# Patient Record
Sex: FEMALE | NOT HISPANIC OR LATINO | Employment: OTHER | URBAN - METROPOLITAN AREA
[De-identification: names, ages, dates, MRNs, and addresses within clinical notes are randomized per-mention and may not be internally consistent; named-entity substitution may affect disease eponyms.]

---

## 2017-03-29 ENCOUNTER — APPOINTMENT (OUTPATIENT)
Dept: MRI IMAGING | Facility: HOSPITAL | Age: 72
End: 2017-03-29
Attending: NEUROLOGICAL SURGERY

## 2017-04-18 ENCOUNTER — HOSPITAL ENCOUNTER (OUTPATIENT)
Dept: MRI IMAGING | Facility: HOSPITAL | Age: 72
Discharge: HOME | End: 2017-04-18
Attending: NEUROLOGICAL SURGERY | Admitting: NEUROLOGICAL SURGERY

## 2017-04-18 ENCOUNTER — OFFICE VISIT (OUTPATIENT)
Dept: NEUROSURGERY | Facility: CLINIC | Age: 72
End: 2017-04-18

## 2017-04-18 VITALS
HEART RATE: 76 BPM | BODY MASS INDEX: 34.96 KG/M2 | WEIGHT: 190 LBS | SYSTOLIC BLOOD PRESSURE: 126 MMHG | DIASTOLIC BLOOD PRESSURE: 74 MMHG | HEIGHT: 62 IN

## 2017-04-18 DIAGNOSIS — D33.3 RIGHT ACOUSTIC NEUROMA (HCC): Primary | ICD-10-CM

## 2017-04-18 DIAGNOSIS — D33.3 RIGHT ACOUSTIC NEUROMA (HCC): ICD-10-CM

## 2017-04-18 DIAGNOSIS — H93.3X1: ICD-10-CM

## 2017-04-18 PROCEDURE — 70552 MRI BRAIN STEM W/DYE: CPT

## 2017-04-18 PROCEDURE — 82565 ASSAY OF CREATININE: CPT

## 2017-04-18 PROCEDURE — 99213 OFFICE O/P EST LOW 20 MIN: CPT | Performed by: NEUROLOGICAL SURGERY

## 2017-04-18 PROCEDURE — 0 GADOBENATE DIMEGLUMINE 529 MG/ML SOLUTION: Performed by: NEUROLOGICAL SURGERY

## 2017-04-18 PROCEDURE — A9577 INJ MULTIHANCE: HCPCS | Performed by: NEUROLOGICAL SURGERY

## 2017-04-18 RX ORDER — POTASSIUM CHLORIDE 20 MEQ/1
TABLET, EXTENDED RELEASE ORAL
Refills: 0 | COMMUNITY
Start: 2017-04-04

## 2017-04-18 RX ADMIN — GADOBENATE DIMEGLUMINE 17 ML: 529 INJECTION, SOLUTION INTRAVENOUS at 12:49

## 2017-04-18 NOTE — PROGRESS NOTES
"Subjective   Patient ID: Jana Muniz is a 71 y.o. female who is here today for follow-up for acoustic neuroma. She had an MRI of the IAC today at [facility]. She presents accompanied by her sister.    History of Present Illness     She returns to the office today for follow-up after repeat MRI of the IAC today at [facility].  She is being seen for continued surveillance of an acoustic neuroma.  She is status post right occipital craniotomy and resection on April 21, 2016.    She is doing better with regard to hearing and she now has a hearing aid in the right ear that is helping transmits down to the left.  She still has ongoing right sided facial droop and weakness.  This was improved with the use of a stimulation, physical therapy and speech therapy.  She reports that since she was last here she's undergone complete L hip replacement in February and is doing well.     Otherwise, she reports doing very well.  She is walking better following the hip replacement and is helping to take care of her 92-year-old and.    She presents with her sister.    /74 (BP Location: Left arm, Patient Position: Sitting, Cuff Size: Adult)  Pulse 76  Ht 62\" (157.5 cm)  Wt 190 lb (86.2 kg)  BMI 34.75 kg/m2    The following portions of the patient's history were reviewed and updated as appropriate: allergies, current medications, past family history, past medical history, past social history, past surgical history and problem list.    Review of Systems   HENT: Negative for tinnitus.    Eyes: Negative for visual disturbance.   Genitourinary: Negative for enuresis.   Neurological: Positive for numbness (Right side of the face). Negative for dizziness, weakness and headaches.       Objective   Physical Exam   Constitutional: She is oriented to person, place, and time. Vital signs are normal. She appears well-developed and well-nourished. She is cooperative.  Non-toxic appearance. She does not have a sickly appearance. She does " not appear ill.   HENT:   Head: Atraumatic.   Right sided hearing aid   Eyes: EOM are normal. Pupils are equal, round, and reactive to light.   Neck: Normal range of motion. Neck supple. No tracheal deviation present.   Pulmonary/Chest: Effort normal and breath sounds normal. No respiratory distress. She has no wheezes.   Abdominal: Soft.   Musculoskeletal: She exhibits no edema, tenderness or deformity.   Neurological: She is alert and oriented to person, place, and time. She displays no tremor and normal reflexes. A cranial nerve deficit is present. No sensory deficit. She exhibits normal muscle tone. Coordination and gait normal. GCS eye subscore is 4. GCS verbal subscore is 5. GCS motor subscore is 6.   Right peripheral facial weakness   Skin: Skin is warm and dry.   Psychiatric: She has a normal mood and affect. Her behavior is normal. Thought content normal.   Vitals reviewed.    Neurologic Exam     Mental Status   Oriented to person, place, and time.   Oriented to person.   Oriented to place.   Level of consciousness: alert    Cranial Nerves     CN II   Visual fields full to confrontation.     CN III, IV, VI   Pupils are equal, round, and reactive to light.  Extraocular motions are normal.     CN V   Right facial sensation deficit: complete  Left facial sensation deficit: none    CN VII   Right facial weakness: peripheral    CN VIII   Hearing: impaired  Turcios: lateralizes right     CN IX, X   CN IX normal.     CN XI   CN XI normal.     CN XII   CN XII normal.   Tongue: not atrophic  Tongue deviation: none      Assessment/Plan   Independent Review of Radiographic Studies:    I personally reviewed the MRI of brain done today that demonstrates 100% resection of the previously identified right acoustic tumor.  Medical Decision Making:    I confirmed and obtained the above history as recorded by the nurse practitioner acting as a scribe. I performed the above examination and it is documented by the nurse  practitioner acting as a scribe.    The patient is doing very well as described above.    Plan: Follow-up MRI in 1 year.  If that is stable anticipate follow-up 2 years after.  Jana was seen today for acoustic neuroma.    Diagnoses and all orders for this visit:    Right acoustic neuroma  -     Cancel: MRI Internal Auditory Canal With Wo; Future  -     MRI Internal Auditory Canal With Contrast; Future    Acoustic neuroma syndrome, right    Return in about 1 year (around 4/18/2018) for Follow up with nurse practitioner.

## 2017-04-20 LAB — CREAT BLDA-MCNC: 1 MG/DL (ref 0.6–1.3)

## 2018-01-23 ENCOUNTER — TELEPHONE (OUTPATIENT)
Dept: NEUROSURGERY | Facility: CLINIC | Age: 73
End: 2018-01-23

## 2018-01-23 DIAGNOSIS — D33.3 RIGHT ACOUSTIC NEUROMA (HCC): Primary | ICD-10-CM

## 2018-01-23 NOTE — TELEPHONE ENCOUNTER
Patient last seen on 4/18/17 by Dr Castillo for right acoustic neuroma. Dr Castillo said to follow up with [facility] in 1 yr     .Patient called and scheduled her recall appt for May 2,2018 with Melody. Pt is suppose to have an MRI the same day.She is coming from 3 hrs away. The order for her MRI  IAC expires  in April 2018. Dejah Cylande is holding an 11:15 time slot for this patient on May 2.

## 2018-05-02 ENCOUNTER — HOSPITAL ENCOUNTER (OUTPATIENT)
Dept: MRI IMAGING | Facility: HOSPITAL | Age: 73
Discharge: HOME | End: 2018-05-02
Admitting: NURSE PRACTITIONER

## 2018-05-02 ENCOUNTER — OFFICE VISIT (OUTPATIENT)
Dept: NEUROSURGERY | Facility: CLINIC | Age: 73
End: 2018-05-02

## 2018-05-02 VITALS
HEIGHT: 62 IN | BODY MASS INDEX: 35.7 KG/M2 | HEART RATE: 64 BPM | WEIGHT: 194 LBS | DIASTOLIC BLOOD PRESSURE: 70 MMHG | SYSTOLIC BLOOD PRESSURE: 120 MMHG

## 2018-05-02 DIAGNOSIS — D33.3 RIGHT ACOUSTIC NEUROMA (HCC): ICD-10-CM

## 2018-05-02 DIAGNOSIS — D33.3 RIGHT ACOUSTIC NEUROMA (HCC): Primary | ICD-10-CM

## 2018-05-02 DIAGNOSIS — Z98.890 S/P CRANIOTOMY: ICD-10-CM

## 2018-05-02 DIAGNOSIS — H90.71 MIXED CONDUCTIVE AND SENSORINEURAL HEARING LOSS OF RIGHT EAR WITH UNRESTRICTED HEARING OF LEFT EAR: ICD-10-CM

## 2018-05-02 PROCEDURE — 99213 OFFICE O/P EST LOW 20 MIN: CPT | Performed by: NURSE PRACTITIONER

## 2018-05-02 PROCEDURE — 0 GADOBENATE DIMEGLUMINE 529 MG/ML SOLUTION: Performed by: NURSE PRACTITIONER

## 2018-05-02 PROCEDURE — 82565 ASSAY OF CREATININE: CPT

## 2018-05-02 PROCEDURE — A9577 INJ MULTIHANCE: HCPCS | Performed by: NURSE PRACTITIONER

## 2018-05-02 PROCEDURE — 70553 MRI BRAIN STEM W/O & W/DYE: CPT

## 2018-05-02 RX ORDER — ESOMEPRAZOLE MAGNESIUM 40 MG/1
40 CAPSULE, DELAYED RELEASE ORAL
COMMUNITY

## 2018-05-02 RX ADMIN — GADOBENATE DIMEGLUMINE 18 ML: 529 INJECTION, SOLUTION INTRAVENOUS at 12:16

## 2018-05-02 NOTE — PROGRESS NOTES
"Subjective   Patient ID: Jana Muniz is a 72 y.o. female who is here today for follow-up for an acoustic neuroma. She had an MRI of the IAC at [facility] today. She presents accompanied by her sisters.     History of Present Illness     She returns to the office today for one-year follow-up with history of a right sided acoustic neuroma status post craniotomy and resection with Dr. Castillo on April 21, 2016.  She is undergone repeat MRI brain imaging for continued surveillance.      Following surgery she has complete hearing loss in the right ear, but she did get a hearing aid in the right ear that is helping transmit sound to the left.  She continues to have some right-sided facial weakness that did improve with the use of an E stimulator.  She is considering getting surgery on the right upper eyelid secondary to lid lag for weakness that is beginning to obstruct her vision.  She is pending a nerve conduction study and EMG on the right side of her face.  She continues to report some issues with balance but denies any falls.  This typically presents when she is walking too fast or when she turns quickly.  Overall, she states that she is feeling very well.  Since her last office visit she has undergone complete left hip replacement.    She presents with 2 sisters.      /70 (BP Location: Right arm, Patient Position: Sitting, Cuff Size: Large Adult)   Pulse 64   Ht 157.5 cm (62\")   Wt 88 kg (194 lb)   BMI 35.48 kg/m²       The following portions of the patient's history were reviewed and updated as appropriate: allergies, current medications, past family history, past medical history, past social history, past surgical history and problem list.    Review of Systems   HENT: Positive for tinnitus.    Eyes: Negative for visual disturbance.   Musculoskeletal: Positive for gait problem.   Neurological: Positive for dizziness. Negative for speech difficulty, weakness, numbness and headaches.       Objective   Physical " Exam   Constitutional: She is oriented to person, place, and time. Vital signs are normal. She appears well-developed and well-nourished. She is cooperative.   Very pleasant, older, obese female   HENT:   Head: Normocephalic and atraumatic.   Eyes: EOM are normal. Pupils are equal, round, and reactive to light.   Cardiovascular: Intact distal pulses.    Pulmonary/Chest: Effort normal.   Abdominal: Soft.   Musculoskeletal: She exhibits no tenderness or deformity.   Moves all extremities well   Neurological: She is alert and oriented to person, place, and time. She has normal strength. A cranial nerve deficit is present. No sensory deficit. She has a normal Finger-Nose-Finger Test and a normal Romberg Test. Gait normal. Coordination and gait normal. GCS eye subscore is 4. GCS verbal subscore is 5. GCS motor subscore is 6.   Stable upright gait, normal station  Able to heel and toe walk  Finger to nose intact bilaterally     Skin: Skin is warm and dry.   Psychiatric: She has a normal mood and affect. Her behavior is normal. Judgment and thought content normal.   Vitals reviewed.    Neurologic Exam     Mental Status   Oriented to person, place, and time.     Cranial Nerves     CN II   Right visual field deficit: none  Left visual field deficit: none     CN III, IV, VI   Pupils are equal, round, and reactive to light.  Extraocular motions are normal.   Right pupil: Shape: regular.   Left pupil: Shape: regular.   CN III: no CN III palsy  CN VI: no CN VI palsy  Diplopia: none    CN V   Right facial sensation deficit: complete  Left facial sensation deficit: none    CN VII   Right facial weakness: peripheral  Left facial weakness: none    CN VIII   Hearing impaired: Total hearing loss on the right.    CN XII   CN XII normal.   Tongue: not atrophic  Fasciculations: absent    Motor Exam     Strength   Strength 5/5 throughout.     Gait, Coordination, and Reflexes     Gait  Gait: normal    Coordination   Romberg:  negative  Finger to nose coordination: normal    Tremor   Resting tremor: absent      Assessment/Plan   Independent Review of Radiographic Studies:    MRI internal auditory canals with and without contrast from [facility] dated May 2, 2018 reveals no residual or recurrent right-sided acoustic neuroma with no other acute abnormalities.      Medical Decision Making:    She returns the office today for one-year follow-up with repeat MRI internal auditory canal imaging status post acoustic neuroma resection 2 years ago with Dr. Castillo.  Exam as noted above, no red flags.  MRI imaging reveals stable findings with no sign of residual or recurrent tumor.  She is doing well with the use of the hearing aid in the right ear that helps amplify hearing on the left.  She states this makes a big difference with regard to her overall hearing status.  She is pending nerve conduction study test on the right side of her face for consideration of surgery to correct the right upper lid lag that is beginning to obstruct her vision.  From our standpoint she is stable and doing well.  She does report some intermittent balance issues but denies falls.  Her gait is stable and upright on examination today.  With stable imaging findings we'll plan on seeing her back in 2 years with repeat MRI for continued surveillance.    Plan: Return to office in 2 years with repeat MRI JCAQUELYN Bates was seen today for brain tumor.    Diagnoses and all orders for this visit:    Right acoustic neuroma    Mixed conductive and sensorineural hearing loss of right ear with unrestricted hearing of left ear    S/P craniotomy      Return in about 2 years (around 5/2/2020).

## 2018-05-03 LAB — CREAT BLDA-MCNC: 1.1 MG/DL (ref 0.6–1.3)

## 2020-03-04 ENCOUNTER — TELEPHONE (OUTPATIENT)
Dept: NEUROSURGERY | Facility: CLINIC | Age: 75
End: 2020-03-04

## 2020-03-04 DIAGNOSIS — D33.3 RIGHT ACOUSTIC NEUROMA (HCC): Primary | ICD-10-CM

## 2020-05-13 ENCOUNTER — APPOINTMENT (OUTPATIENT)
Dept: MRI IMAGING | Facility: HOSPITAL | Age: 75
End: 2020-05-13

## 2020-06-01 ENCOUNTER — TELEPHONE (OUTPATIENT)
Dept: NEUROSURGERY | Facility: CLINIC | Age: 75
End: 2020-06-01

## 2020-06-01 NOTE — TELEPHONE ENCOUNTER
PATIENT IS SCHEDULED FOR A MRI ON 6/16 AND HER APPT WAS SCHEDULED FOR 6/16 BUT HAS BEEN CANCELLED. PATIENT WANTS TO KNOW IF HER MRI CAN BE DONE AT [facility] IN [place], [place] AND ONCE REVIEWED BY CASANDRA BARTON IF SHE WOULD THEN NEED TO COME IN AND BE SEEN.    PATIENT CAN BE REACHED [phone]

## 2020-06-02 ENCOUNTER — TELEPHONE (OUTPATIENT)
Dept: NEUROSURGERY | Facility: CLINIC | Age: 75
End: 2020-06-02

## 2020-06-02 NOTE — TELEPHONE ENCOUNTER
PATIENT IS SCHEDULED FOR A MRI ON 6/16 AND HER APPT WAS SCHEDULED FOR 6/16 BUT HAS BEEN CANCELLED. PATIENT WANTS TO KNOW IF HER MRI CAN BE DONE AT [facility] IN [place], [place] AND ONCE REVIEWED BY CASANDRA BARTON IF SHE WOULD THEN NEED TO COME IN AND BE SEEN.     PATIENT CAN BE REACHED [phone]

## 2020-06-08 NOTE — TELEPHONE ENCOUNTER
LMR that order is being mailed to her today and to call us to schedule telephone visit once she has it set up.  All instructions left on VM for pt.

## 2020-06-16 ENCOUNTER — APPOINTMENT (OUTPATIENT)
Dept: MRI IMAGING | Facility: HOSPITAL | Age: 75
End: 2020-06-16

## 2020-07-01 NOTE — PROGRESS NOTES
Subjective   History of Present Illness: Jana Muniz is a 74 y.o. female is here today for follow-up via telephone visit. She was last in the office 5/2/18 for follow-up of an acoustic neuroma. She had MRI IAC at [facility] 6/24/20. Today she reports her only issue is her balance/gait. She states this has been an issue since her surgery with Dr. Castillo in 2016.     You have chosen to receive care through a telephone visit. Do you consent to use a telephone visit for your medical care today? Yes      History of Present Illness  She still has imbalance, but says it is somewhat better since she was last seen. She has been to therapy for her balance which has helped. She says she does not fall, but she is still unsure of her footing, worse when stepping over objects or stepping up. She did fall in November 2019 but it was a mechanical fall and she broke her leg. Due to the broken leg, she was unable to get the surgery for her lid lag which was schedule in December and then the coronavirus hit, so she is currently trying to get that surgery rescheduled.     The following portions of the patient's history were reviewed and updated as appropriate: allergies, current medications, past family history, past medical history, past social history, past surgical history and problem list.    Review of Systems   Constitutional: Negative for chills and fever.   Eyes: Negative for visual disturbance.   Genitourinary: Negative for difficulty urinating and frequency.   Musculoskeletal: Positive for gait problem.   Neurological: Negative for dizziness, syncope, speech difficulty, light-headedness and headaches.   Psychiatric/Behavioral: Negative for confusion and decreased concentration.       Objective     .There were no vitals taken for this visit.   There is no height or weight on file to calculate BMI.      Physical Exam  Neurologic Exam  Physical exam deferred due to this being a telephone visit    Assessment/Plan    Independent Review of Radiographic Studies:      I personally reviewed the images from the following studies.    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 She also lives over 3 hours away. I spent 12 minutes on the phone discussing the results of the MRI and follow-up plan.  She currently has no new symptoms imbalance which actually has gotten better.  She is going to schedule the lid surgery as soon as she can.  We will follow-up again in 2 years with another MRI but she knows to call sooner with any questions or concerns.  That follow-up also can be by phone if it works out better for her as she really appreciated it today since she lives 3 hours away.    Jana was seen today for right acoustic neuroma.    Diagnoses and all orders for this visit:    Imbalance    History of acoustic neuroma  -     MRI internal auditory canal w wo; Future      Return in about 2 years (around 7/7/2022).

## 2020-07-07 ENCOUNTER — OFFICE VISIT (OUTPATIENT)
Dept: NEUROSURGERY | Facility: CLINIC | Age: 75
End: 2020-07-07

## 2020-07-07 ENCOUNTER — TELEPHONE (OUTPATIENT)
Dept: NEUROSURGERY | Facility: CLINIC | Age: 75
End: 2020-07-07

## 2020-07-07 DIAGNOSIS — R26.89 IMBALANCE: Primary | ICD-10-CM

## 2020-07-07 DIAGNOSIS — Z86.018 HISTORY OF ACOUSTIC NEUROMA: ICD-10-CM

## 2020-07-07 PROCEDURE — 99442 PR PHYS/QHP TELEPHONE EVALUATION 11-20 MIN: CPT | Performed by: NURSE PRACTITIONER

## 2020-07-07 RX ORDER — CEPHALEXIN 500 MG/1
500 CAPSULE ORAL 4 TIMES DAILY
COMMUNITY
Start: 2020-05-06

## 2020-07-07 NOTE — TELEPHONE ENCOUNTER
Attempted to reach patient regarding clinical workup prior to telephone visit with Janie Martinez today at 9:45am. Left message for patient to return my call.

## 2022-07-18 DIAGNOSIS — D33.3 RIGHT ACOUSTIC NEUROMA: Primary | ICD-10-CM

## 2022-07-29 ENCOUNTER — TELEPHONE (OUTPATIENT)
Dept: NEUROSURGERY | Facility: CLINIC | Age: 77
End: 2022-07-29

## 2022-07-29 NOTE — TELEPHONE ENCOUNTER
I spoke with Ms. Muniz was returning a call from Mounika. She reports she would like to avoid coming to [place] for a follow up visit and MRI due to her being 4 hours away and the time changes and all. She reports she would like to have them MRI at [facility] and would like for them to send the doctor the results and us call her with the results and let her know if she needs to be seen in the office.

## 2022-07-29 NOTE — TELEPHONE ENCOUNTER
Spoke with pt and she is going to have her PCP refer her to someone closer to home for her acoustic neuroma.

## 2022-10-17 NOTE — PROGRESS NOTES
Subjective   Patient ID: Jana Muniz is a 77 y.o. female is here today for follow-up with a new MRI IAC done on 10/18/2022 at [facility].    Today patient stats that she Is having gait/balance issues. Patient is having issues with stepping over things.     Patient, Provider, and MA are all wearing a mask in our office today.     History of Present Illness     This patient returns today.  She feels that her gait and balance issues are getting a little bit worse.    The following portions of the patient's history were reviewed and updated as appropriate: allergies, current medications, past family history, past medical history, past social history, past surgical history and problem list.    Review of Systems   Constitutional: Negative for chills and fever.   HENT: Negative for congestion.    Eyes: Negative for photophobia and visual disturbance.   Musculoskeletal: Positive for gait problem. Negative for myalgias and neck stiffness.   Neurological: Negative for numbness and headaches.       I have reviewed the review of systems as documented by my MA.      Objective     There were no vitals filed for this visit.  There is no height or weight on file to calculate BMI.      Physical Exam  Neurological:      Mental Status: She is alert and oriented to person, place, and time.       Neurologic Exam     Mental Status   Oriented to person, place, and time.           Assessment & Plan   Independent Review of Radiographic Studies:      I personally reviewed the images from the following studies.    I reviewed her MRI which was done earlier today.  It is not yet been read.  I do not see any evidence of recurrence nor do I see any evidence of a stroke.    Medical Decision Making:      I told the patient and her daughter about the imaging.  I think the trouble she is having with walking is simply due to aging and decreased ability to compensate for a previous deficit.  I told her I will call her with the radiology reports she will do  something different than what I thought I saw.  Otherwise we will scan her again in 2 years.    Diagnoses and all orders for this visit:    1. Right acoustic neuroma (HCC) (Primary)  -     MRI internal auditory canal w wo; Future      Return in about 2 years (around 10/18/2024).

## 2022-10-18 ENCOUNTER — OFFICE VISIT (OUTPATIENT)
Dept: NEUROSURGERY | Facility: CLINIC | Age: 77
End: 2022-10-18

## 2022-10-18 ENCOUNTER — APPOINTMENT (OUTPATIENT)
Dept: OTHER | Facility: HOSPITAL | Age: 77
End: 2022-10-18

## 2022-10-18 ENCOUNTER — HOSPITAL ENCOUNTER (OUTPATIENT)
Dept: MRI IMAGING | Facility: HOSPITAL | Age: 77
Discharge: HOME | End: 2022-10-18

## 2022-10-18 DIAGNOSIS — Z09 FOLLOW-UP EXAM: ICD-10-CM

## 2022-10-18 DIAGNOSIS — D33.3 RIGHT ACOUSTIC NEUROMA: ICD-10-CM

## 2022-10-18 DIAGNOSIS — D33.3 RIGHT ACOUSTIC NEUROMA: Primary | ICD-10-CM

## 2022-10-18 PROCEDURE — A9577 INJ MULTIHANCE: HCPCS | Performed by: NEUROLOGICAL SURGERY

## 2022-10-18 PROCEDURE — 82565 ASSAY OF CREATININE: CPT

## 2022-10-18 PROCEDURE — 0 GADOBENATE DIMEGLUMINE 529 MG/ML SOLUTION: Performed by: NEUROLOGICAL SURGERY

## 2022-10-18 PROCEDURE — 99214 OFFICE O/P EST MOD 30 MIN: CPT | Performed by: NEUROLOGICAL SURGERY

## 2022-10-18 PROCEDURE — 70553 MRI BRAIN STEM W/O & W/DYE: CPT

## 2022-10-18 RX ADMIN — GADOBENATE DIMEGLUMINE 18 ML: 529 INJECTION, SOLUTION INTRAVENOUS at 10:27

## 2022-10-19 LAB — CREAT BLDA-MCNC: 1.4 MG/DL (ref 0.6–1.3)

## 2024-11-08 ENCOUNTER — TELEPHONE (OUTPATIENT)
Dept: NEUROSURGERY | Facility: CLINIC | Age: 79
End: 2024-11-08
Payer: MEDICARE